# Patient Record
Sex: FEMALE | Race: WHITE | NOT HISPANIC OR LATINO | RURAL
[De-identification: names, ages, dates, MRNs, and addresses within clinical notes are randomized per-mention and may not be internally consistent; named-entity substitution may affect disease eponyms.]

---

## 2023-06-21 ENCOUNTER — WEB ENCOUNTER (OUTPATIENT)
Dept: RURAL CLINIC 2 | Facility: CLINIC | Age: 68
End: 2023-06-21

## 2023-06-21 ENCOUNTER — DASHBOARD ENCOUNTERS (OUTPATIENT)
Age: 68
End: 2023-06-21

## 2023-06-21 ENCOUNTER — LAB OUTSIDE AN ENCOUNTER (OUTPATIENT)
Dept: RURAL CLINIC 2 | Facility: CLINIC | Age: 68
End: 2023-06-21

## 2023-06-21 ENCOUNTER — OFFICE VISIT (OUTPATIENT)
Dept: RURAL CLINIC 2 | Facility: CLINIC | Age: 68
End: 2023-06-21
Payer: MEDICARE

## 2023-06-21 VITALS
DIASTOLIC BLOOD PRESSURE: 70 MMHG | BODY MASS INDEX: 21.82 KG/M2 | HEART RATE: 61 BPM | TEMPERATURE: 97.8 F | WEIGHT: 139 LBS | HEIGHT: 67 IN | SYSTOLIC BLOOD PRESSURE: 105 MMHG

## 2023-06-21 DIAGNOSIS — D50.9 ANEMIA: ICD-10-CM

## 2023-06-21 DIAGNOSIS — Z87.19 HISTORY OF ESOPHAGEAL ULCER: ICD-10-CM

## 2023-06-21 DIAGNOSIS — K21.9 CHRONIC GERD: ICD-10-CM

## 2023-06-21 DIAGNOSIS — R14.0 ABDOMINAL BLOATING: ICD-10-CM

## 2023-06-21 PROBLEM — 116289008: Status: ACTIVE | Noted: 2023-06-21

## 2023-06-21 PROBLEM — 79922009: Status: ACTIVE | Noted: 2023-06-21

## 2023-06-21 PROBLEM — 16089091000119106: Status: ACTIVE | Noted: 2023-06-21

## 2023-06-21 PROBLEM — 84229001: Status: ACTIVE | Noted: 2023-06-21

## 2023-06-21 PROBLEM — 235595009: Status: ACTIVE | Noted: 2023-06-21

## 2023-06-21 PROBLEM — 87522002: Status: ACTIVE | Noted: 2023-06-21

## 2023-06-21 PROCEDURE — 99203 OFFICE O/P NEW LOW 30 MIN: CPT | Performed by: NURSE PRACTITIONER

## 2023-06-21 RX ORDER — PROPRANOLOL HYDROCHLORIDE 40 MG/1
1 TABLET TABLET ORAL ONCE A DAY
Status: ACTIVE | COMMUNITY

## 2023-06-21 RX ORDER — PAROXETINE HYDROCHLORIDE 20 MG/1
TABLET, FILM COATED ORAL
Qty: 0 | Refills: 0 | Status: DISCONTINUED | COMMUNITY
Start: 1900-01-01

## 2023-06-21 RX ORDER — RIZATRIPTAN BENZOATE 5 MG/1
1 TABLET TABLET ORAL ONCE A DAY
Status: ACTIVE | COMMUNITY

## 2023-06-21 RX ORDER — METHOCARBAMOL 500 MG/1
1.5 TABLETS TABLET ORAL
Status: ACTIVE | COMMUNITY

## 2023-06-21 RX ORDER — PAROXETINE HYDROCHLORIDE HEMIHYDRATE 20 MG/1
TABLET, FILM COATED ORAL
Qty: 0 | Refills: 0 | Status: DISCONTINUED | COMMUNITY
Start: 1900-01-01

## 2023-06-21 RX ORDER — SUCRALFATE 1 G/1
TAKE 1 TABLET (1 GRAM) BY ORAL ROUTE 2 TIMES PER DAY ON AN EMPTY STOMACH FOR 30 DAYS TABLET ORAL 2
Qty: 60 | Refills: 2 | Status: ACTIVE | COMMUNITY
Start: 2018-01-30

## 2023-06-21 RX ORDER — SODIUM PICOSULFATE, MAGNESIUM OXIDE, AND ANHYDROUS CITRIC ACID 12; 3.5; 1 G/175ML; G/175ML; MG/175ML
175 ML THE FIRST DOSE THE EVENING BEFORE AND SECOND DOSE THE MORNING OF COLONOSCOPY LIQUID ORAL ONCE A DAY
Qty: 350 | Refills: 0 | OUTPATIENT
Start: 2023-06-21 | End: 2023-06-23

## 2023-06-21 NOTE — HPI-ZZZTODAY'S VISIT
The patient is a 67-year-old female who presents on referral from Roselyn Perera NP for epigastric abdominal pain and recent diagnosis of iron deficiency anemia.  A copy of this document to be sent to the referring provider.  The patient reports a couple month history of epigastric abdominal pain occurring intermittently and typically related to food.  She also reports a history of GERD with intermittent acid reflux and abdominal bloating.  She was recently started on Prilosec and sucralfate and seems to be helping.  Recent diagnosis of iron deficiency anemia and recently referred to hematology for iron infusion.    The patient was worked up for iron deficiency anemia 5 years ago where she underwent both an upper endoscopy and colonoscopy completed by Dr. Farmer. Records available and reviewed with findings of colonic diverticulosis and internal hemorrhoids.  EGD findings of grade C esophagitis with ulcer.  The patient denies any lower GI issues to include abdominal pain, constipation, diarrhea or rectal bleeding.  She denies symptoms of shortness of breath, chest pain, palpitations or lightheadedness.

## 2023-06-29 ENCOUNTER — OFFICE VISIT (OUTPATIENT)
Dept: URBAN - METROPOLITAN AREA SURGERY CENTER 16 | Facility: SURGERY CENTER | Age: 68
End: 2023-06-29

## 2023-09-27 ENCOUNTER — OFFICE VISIT (OUTPATIENT)
Dept: RURAL MEDICAL CENTER 4 | Facility: MEDICAL CENTER | Age: 68
End: 2023-09-27

## 2023-09-27 ENCOUNTER — CLAIMS CREATED FROM THE CLAIM WINDOW (OUTPATIENT)
Dept: RURAL MEDICAL CENTER 4 | Facility: MEDICAL CENTER | Age: 68
End: 2023-09-27

## 2023-09-27 ENCOUNTER — TELEPHONE ENCOUNTER (OUTPATIENT)
Dept: URBAN - METROPOLITAN AREA CLINIC 105 | Facility: CLINIC | Age: 68
End: 2023-09-27

## 2023-09-27 ENCOUNTER — CLAIMS CREATED FROM THE CLAIM WINDOW (OUTPATIENT)
Dept: RURAL MEDICAL CENTER 4 | Facility: MEDICAL CENTER | Age: 68
End: 2023-09-27
Payer: MEDICARE

## 2023-09-27 DIAGNOSIS — K22.89 DILATATION OF ESOPHAGUS: ICD-10-CM

## 2023-09-27 DIAGNOSIS — D50.9 ANEMIA: ICD-10-CM

## 2023-09-27 DIAGNOSIS — D12.2 ADENOMA OF ASCENDING COLON: ICD-10-CM

## 2023-09-27 DIAGNOSIS — D50.9 IRON DEFICIENCY ANEMIA, UNSPECIFIED IRON DEFICIENCY ANEMIA TYPE: ICD-10-CM

## 2023-09-27 DIAGNOSIS — K29.60 ADENOPAPILLOMATOSIS GASTRICA: ICD-10-CM

## 2023-09-27 DIAGNOSIS — K31.7 BENIGN GASTRIC POLYP: ICD-10-CM

## 2023-09-27 PROCEDURE — 45385 COLONOSCOPY W/LESION REMOVAL: CPT | Performed by: INTERNAL MEDICINE

## 2023-09-27 PROCEDURE — 43239 EGD BIOPSY SINGLE/MULTIPLE: CPT | Performed by: INTERNAL MEDICINE

## 2023-09-27 RX ORDER — OMEPRAZOLE 40 MG/1
1 CAPSULE CAPSULE, DELAYED RELEASE ORAL ONCE A DAY
Qty: 90 CAPSULE | Refills: 3 | OUTPATIENT
Start: 2023-09-27

## 2023-09-27 RX ORDER — METHOCARBAMOL 500 MG/1
1.5 TABLETS TABLET ORAL
Status: ACTIVE | COMMUNITY

## 2023-09-27 RX ORDER — PROPRANOLOL HYDROCHLORIDE 40 MG/1
1 TABLET TABLET ORAL ONCE A DAY
Status: ACTIVE | COMMUNITY

## 2023-09-27 RX ORDER — SUCRALFATE 1 G/1
TAKE 1 TABLET (1 GRAM) BY ORAL ROUTE 2 TIMES PER DAY ON AN EMPTY STOMACH FOR 30 DAYS TABLET ORAL 2
Qty: 60 | Refills: 2 | Status: ACTIVE | COMMUNITY
Start: 2018-01-30

## 2023-09-27 RX ORDER — RIZATRIPTAN BENZOATE 5 MG/1
1 TABLET TABLET ORAL ONCE A DAY
Status: ACTIVE | COMMUNITY

## 2023-10-30 ENCOUNTER — OFFICE VISIT (OUTPATIENT)
Dept: RURAL CLINIC 1 | Facility: CLINIC | Age: 68
End: 2023-10-30

## 2025-02-24 ENCOUNTER — P2P PATIENT RECORD (OUTPATIENT)
Age: 70
End: 2025-02-24

## 2025-05-13 ENCOUNTER — OFFICE VISIT (OUTPATIENT)
Dept: RURAL CLINIC 8 | Facility: CLINIC | Age: 70
End: 2025-05-13